# Patient Record
Sex: FEMALE | Race: WHITE | NOT HISPANIC OR LATINO | URBAN - METROPOLITAN AREA
[De-identification: names, ages, dates, MRNs, and addresses within clinical notes are randomized per-mention and may not be internally consistent; named-entity substitution may affect disease eponyms.]

---

## 2019-12-05 ENCOUNTER — AMBULATORY - HEALTHEAST (OUTPATIENT)
Dept: MATERNAL FETAL MEDICINE | Facility: HOSPITAL | Age: 39
End: 2019-12-05

## 2019-12-05 DIAGNOSIS — O26.90 PREGNANCY, ANTEPARTUM, COMPLICATIONS: ICD-10-CM

## 2019-12-13 ENCOUNTER — AMBULATORY - HEALTHEAST (OUTPATIENT)
Dept: MATERNAL FETAL MEDICINE | Facility: HOSPITAL | Age: 39
End: 2019-12-13

## 2019-12-16 ENCOUNTER — HOSPITAL ENCOUNTER (OUTPATIENT)
Dept: OBGYN | Facility: HOSPITAL | Age: 39
Discharge: HOME | End: 2019-12-16
Attending: OBSTETRICS & GYNECOLOGY | Admitting: OBSTETRICS & GYNECOLOGY

## 2019-12-16 LAB
ABO/RH(D): NORMAL
ALT SERPL W P-5'-P-CCNC: 12 U/L (ref 0–45)
ANTIBODY SCREEN: NEGATIVE
APTT PPP: 27 SECONDS (ref 24–37)
AST SERPL W P-5'-P-CCNC: 13 U/L (ref 0–40)
CREAT SERPL-MCNC: 0.57 MG/DL (ref 0.6–1.1)
CREAT UR-MCNC: 20.7 MG/DL
ERYTHROCYTE [DISTWIDTH] IN BLOOD BY AUTOMATED COUNT: 13 % (ref 11–14.5)
GFR SERPL CREATININE-BSD FRML MDRD: >60 ML/MIN/1.73M2
GLUCOSE BLDC GLUCOMTR-MCNC: 117 MG/DL (ref 70–139)
HCT VFR BLD AUTO: 35.8 % (ref 35–47)
HGB BLD-MCNC: 11.9 G/DL (ref 12–16)
INR PPP: 0.97 (ref 0.9–1.1)
MCH RBC QN AUTO: 31.3 PG (ref 27–34)
MCHC RBC AUTO-ENTMCNC: 33.2 G/DL (ref 32–36)
MCV RBC AUTO: 94 FL (ref 80–100)
PLATELET # BLD AUTO: 205 THOU/UL (ref 140–440)
PMV BLD AUTO: 10.9 FL (ref 8.5–12.5)
PROTEIN, RANDOM URINE - HISTORICAL: <7 MG/DL
PROTEIN/CREAT RATIO, RANDOM UR: NORMAL
RBC # BLD AUTO: 3.8 MILL/UL (ref 3.8–5.4)
URATE SERPL-MCNC: 3 MG/DL (ref 2–7.5)
WBC: 12.8 THOU/UL (ref 4–11)

## 2019-12-28 ENCOUNTER — ANESTHESIA - HEALTHEAST (OUTPATIENT)
Dept: OBGYN | Facility: HOSPITAL | Age: 39
End: 2019-12-28

## 2019-12-31 ENCOUNTER — HOME CARE/HOSPICE - HEALTHEAST (OUTPATIENT)
Dept: HOME HEALTH SERVICES | Facility: HOME HEALTH | Age: 39
End: 2019-12-31

## 2020-01-07 ENCOUNTER — RECORDS - HEALTHEAST (OUTPATIENT)
Dept: ADMINISTRATIVE | Facility: OTHER | Age: 40
End: 2020-01-07

## 2020-01-28 ENCOUNTER — RECORDS - HEALTHEAST (OUTPATIENT)
Dept: ADMINISTRATIVE | Facility: OTHER | Age: 40
End: 2020-01-28

## 2021-04-18 ENCOUNTER — AMBULATORY - HEALTHEAST (OUTPATIENT)
Dept: SURGERY | Facility: AMBULATORY SURGERY CENTER | Age: 41
End: 2021-04-18

## 2021-04-18 DIAGNOSIS — Z11.59 ENCOUNTER FOR SCREENING FOR OTHER VIRAL DISEASES: ICD-10-CM

## 2021-05-09 ENCOUNTER — AMBULATORY - HEALTHEAST (OUTPATIENT)
Dept: FAMILY MEDICINE | Facility: CLINIC | Age: 41
End: 2021-05-09

## 2021-05-09 DIAGNOSIS — Z11.59 ENCOUNTER FOR SCREENING FOR OTHER VIRAL DISEASES: ICD-10-CM

## 2021-05-09 LAB
SARS-COV-2 PCR COMMENT: NORMAL
SARS-COV-2 RNA SPEC QL NAA+PROBE: NEGATIVE
SARS-COV-2 VIRUS SPECIMEN SOURCE: NORMAL

## 2021-05-10 ENCOUNTER — COMMUNICATION - HEALTHEAST (OUTPATIENT)
Dept: SCHEDULING | Facility: CLINIC | Age: 41
End: 2021-05-10

## 2021-05-11 ENCOUNTER — RECORDS - HEALTHEAST (OUTPATIENT)
Dept: ADMINISTRATIVE | Facility: OTHER | Age: 41
End: 2021-05-11

## 2021-05-11 ENCOUNTER — ANESTHESIA - HEALTHEAST (OUTPATIENT)
Dept: SURGERY | Facility: AMBULATORY SURGERY CENTER | Age: 41
End: 2021-05-11

## 2021-05-11 ASSESSMENT — MIFFLIN-ST. JEOR: SCORE: 1609.36

## 2021-05-12 ENCOUNTER — SURGERY - HEALTHEAST (OUTPATIENT)
Dept: SURGERY | Facility: AMBULATORY SURGERY CENTER | Age: 41
End: 2021-05-12

## 2021-05-12 ENCOUNTER — RECORDS - HEALTHEAST (OUTPATIENT)
Dept: ADMINISTRATIVE | Facility: OTHER | Age: 41
End: 2021-05-12

## 2021-05-12 ENCOUNTER — COMMUNICATION - HEALTHEAST (OUTPATIENT)
Dept: SCHEDULING | Facility: CLINIC | Age: 41
End: 2021-05-12

## 2021-05-12 ASSESSMENT — MIFFLIN-ST. JEOR: SCORE: 1604.82

## 2021-05-27 VITALS — WEIGHT: 200 LBS | BODY MASS INDEX: 31.32 KG/M2

## 2021-05-27 VITALS — HEIGHT: 67 IN | BODY MASS INDEX: 31.32 KG/M2

## 2021-05-27 VITALS — WEIGHT: 201 LBS | BODY MASS INDEX: 31.48 KG/M2 | BODY MASS INDEX: 34.77 KG/M2 | HEIGHT: 67 IN

## 2021-05-29 ENCOUNTER — RECORDS - HEALTHEAST (OUTPATIENT)
Dept: ADMINISTRATIVE | Facility: CLINIC | Age: 41
End: 2021-05-29

## 2021-05-30 ENCOUNTER — RECORDS - HEALTHEAST (OUTPATIENT)
Dept: ADMINISTRATIVE | Facility: CLINIC | Age: 41
End: 2021-05-30

## 2021-06-04 VITALS — BODY MASS INDEX: 33.2 KG/M2 | BODY MASS INDEX: 33.2 KG/M2 | WEIGHT: 212 LBS | HEIGHT: 67 IN

## 2021-06-04 VITALS — BODY MASS INDEX: 33.2 KG/M2 | HEIGHT: 67 IN

## 2021-06-04 VITALS — BODY MASS INDEX: 33.99 KG/M2 | WEIGHT: 217 LBS

## 2021-06-04 NOTE — ANESTHESIA PROCEDURE NOTES
Epidural Block    Patient location during procedure: OB    Reason for Block:labor epidural  Staffing:  Performing  Anesthesiologist: Sharda Dent MD  Preanesthetic Checklist  Completed: patient identified, risks, benefits, and alternatives discussed, timeout performed, consent obtained, airway assessed, oxygen available, suction available, emergency drugs available and hand hygiene performed  Procedure  Patient position: sitting  Prep: ChloraPrep  Patient monitoring: continuous pulse oximetry, heart rate and blood pressure  Approach: midline  Location: L3-L4  Injection technique: BERYL saline  Number of Attempts:2  Needle  Needle type: Tuohy   Needle gauge: 17 G     Catheter in Space: 4 (BERYL @ 8, taped @ 12)  Assessment  Sensory level:      Events: positive IV test and Initial attepmt + test dose (negative aspiration, no blood in catheter on removal). Second placement successful

## 2021-06-04 NOTE — ANESTHESIA POSTPROCEDURE EVALUATION
Patient: Lauren Pacheco  * No procedures listed *  Anesthesia type: epidural    Patient location: Labor and Delivery  Last vitals: No vitals data found for the desired time range.    Post vital signs: stable  Level of consciousness: awake and responds to simple questions  Post-anesthesia pain: pain controlled  Post-anesthesia nausea and vomiting: no  Pulmonary: unassisted, return to baseline  Cardiovascular: stable and blood pressure at baseline  Hydration: adequate  Anesthetic events: no    QCDR Measures:  ASA# 11 - Gayla-op Cardiac Arrest: ASA11B - Patient did NOT experience unanticipated cardiac arrest  ASA# 12 - Gayla-op Mortality Rate: ASA12B - Patient did NOT die  ASA# 13 - PACU Re-Intubation Rate: ASA13B - Patient did NOT require a new airway mgmt  ASA# 10 - Composite Anes Safety: ASA10A - No serious adverse event    Additional Notes:    Neuraxial block has worn off, no residual numbness or weakness. Pain controlled. Denies headache or back pain. No further questions or concerns. Instructed that we are available 24/7 should she have questions pertaining to her epidural.

## 2021-06-04 NOTE — PROGRESS NOTES
"Lauren arrived to [facility] today about 1115. 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 At which time she took her blood sugar and blood pressure.  BG was 48 per her report and 127 after eating. She reports her /101 and -130. Overall \"not feeling well today\".   Lauren also explains that she has \"floaters\" in her vision this morning which she has had before due to ocular migraines.     EFM fetal baseline 155 accels and no decelerations, moderate variability. No contractions felt or palpated. Irritability traced on toco.  No vaginal bleeding, leaking of fluid, and patient reports no signs or symptoms of infection.     Blood pressures reading 145/96 - 154/100 .   No headache, no epigastric pains, no clonus and reflexes WDL.   Dr.K Pinto provided with the above information and I received orders for [facility] labs, POCT and Q30min BP until MD arrives to bedside to assess.   Patient and spouse in agreement to plan.       "

## 2021-06-04 NOTE — PROGRESS NOTES
Vitals:    12/16/19 1220 12/16/19 1307 12/16/19 1330 12/16/19 1406   BP: (!) 139/96 142/90 139/89 140/88   Patient Position:       Cuff Size:       Pulse: 100 (!) 102 99 98   Resp:       Temp:       TempSrc:         Lauren reports no headache, no vision changes, no epigastric pains, Reflexes WDL and no clonus. EFM indicates fetal baseline 140-145 accelerations, no decelerations and moderate variability. Irritability resolved, Abd palpated soft and Lauren reports she is not feeling any contractions.    Dr.K Pinto provided with update on EFM, vital signs, assessments and lab results.   Patient to discharge home and keep her scheduled appointment for Wednesday with Dr. Craft for follow up.   Patient is in agreement to plan.

## 2021-06-04 NOTE — PROGRESS NOTES
Triage note    40 yo  presented to triage for evaluation after checking her blood sugar and blood pressure at work. She said she did not feel well and had issues with hypoglycemia early in pregnancy so she checked her CBG and it was 48. She had a snack and rechecked her blood sugar and it was in the 120s. She did then check her blood pressure and the systolic pressure was in the 150s so she presented for further evaluation. She currently denies symptoms. She denies headaches, changes in vision or RUQ pain. + FM. Occasional, mild contractions. Denies vaginal bleeding or loss of fluid.     OB Complications:  H/o GHTN  HSV - Prophylaxis  IVF pregnancy with CRM  Sebaceous cysts of labia  H/o CT    Temp:  [98.5  F (36.9  C)] 98.5  F (36.9  C)  Heart Rate:  [] 98  Resp:  [20] 20  BP: (139-145)/(88-96) 140/88    CR 0.57  AST 13  ALT 12  Uric Acid 3  INR 0.97  PTT 27  Hgb: 11.9  HCT: 35.8  Plts 205    FHTs: 150s, Mod BTBV, + accel, no decels  Easley: Occational    A/P: 40 yo at 36w 1d presented for evaluation of blood pressure  -- BPs elevated but non severe. PIH labs wnl. OK to DC home. Keep scheduled follow up with Dr. Craft on Wednesday.     Di Pinto MD  (P) [phone]

## 2021-06-04 NOTE — PROGRESS NOTES
RN spoke with [facility] on  to clarify referral that was sent to [facility]. Order was for [facility] fetal echo s/t IVF. However, pt was approx 35 weeks and visualization of heart views was expected to be poor. Per M MD, order clarified with [facility] if they would still like an echo done vs planning on a  echo. [facility] confirmed they would have the pt schedule an echo following delivery. [facility] also declines a L2 or any other ultrasound with [facility] at this time. [facility] to follow up with the patient.

## 2021-06-04 NOTE — ANESTHESIA PREPROCEDURE EVALUATION
Anesthesia Evaluation      No history of anesthetic complications     Airway    Pulmonary    (-) asthma                         Cardiovascular   (+) hypertension (gestational. no s/sx of pre-e, labs wnl), ,      Neuro/Psych - negative ROS     Endo/Other    (+) obesity, pregnant ( 37w6d, IOL for GHTN, requesting labor epidural)     GI/Hepatic/Renal - negative ROS           Dental                         Anesthesia Plan  Planned anesthetic: epidural  Patient requests labor epidural. Chart reviewed, including labs. Reviewed options and risks with the patient, including but not limited to: bleeding, infection, damage to tissues under the skin (nerves, muscles, blood vessels), hypotension, headache, and epidural failure. Questions answered, consent signed. Patient agrees to elective labor epidural.     ASA 3     Anesthetic plan and risks discussed with: patient    Post-op plan: epidural analgesia

## 2021-06-17 NOTE — ANESTHESIA CARE TRANSFER NOTE
Last vitals:   Vitals:    05/12/21 1027   BP: 122/73   Pulse: 80   Resp: 18   Temp: 36.3  C (97.3  F)   SpO2: 95%     Patient's level of consciousness is drowsy  Spontaneous respirations: yes  Maintains airway independently: yes  Dentition unchanged: yes  Oropharynx: oropharynx clear of all foreign objects    QCDR Measures:  ASA# 20 - Surgical Safety Checklist: WHO surgical safety checklist completed prior to induction    PQRS# 430 - Adult PONV Prevention: 4558F - Pt received => 2 anti-emetic agents (different classes) preop & intraop  ASA# 8 - Peds PONV Prevention: NA - Not pediatric patient, not GA or 2 or more risk factors NOT present  PQRS# 424 - Gayla-op Temp Management: 4559F - At least one body temp DOCUMENTED => 35.5C or 95.9F within required timeframe  PQRS# 426 - PACU Transfer Protocol: - Transfer of care checklist used  ASA# 14 - Acute Post-op Pain: ASA14B - Patient did NOT experience pain >= 7 out of 10

## 2021-06-17 NOTE — ANESTHESIA POSTPROCEDURE EVALUATION
Patient: Lauren Pacheco  Procedure(s):  HYSTEROSCOPY, WITH DILATION AND CURETTAGE  Anesthesia type: MAC    Patient location: Phase II Recovery  Last vitals:   Vitals Value Taken Time   /76 05/12/21 1101   Temp 36.3  C (97.3  F) 05/12/21 1027   Pulse 67 05/12/21 1131   Resp 16 05/12/21 1151   SpO2 99 % 05/12/21 1131   Vitals shown include unvalidated device data.  Post vital signs: stable  Level of consciousness: awake and responds to simple questions  Post-anesthesia pain: pain controlled  Post-anesthesia nausea and vomiting: no  Pulmonary: unassisted, return to baseline  Cardiovascular: stable and blood pressure at baseline  Hydration: adequate  Anesthetic events: no    QCDR Measures:  ASA# 11 - Gayla-op Cardiac Arrest: ASA11B - Patient did NOT experience unanticipated cardiac arrest  ASA# 12 - Gayla-op Mortality Rate: ASA12B - Patient did NOT die  ASA# 13 - PACU Re-Intubation Rate: ASA13B - Patient did NOT require a new airway mgmt  ASA# 10 - Composite Anes Safety: ASA10A - No serious adverse event    Additional Notes:

## 2021-06-17 NOTE — ANESTHESIA PREPROCEDURE EVALUATION
Anesthesia Evaluation      Patient summary reviewed   No history of anesthetic complications     Airway   Mallampati: II   Pulmonary - negative ROS and normal exam                          Cardiovascular - negative ROS and normal exam  Exercise tolerance: > or = 4 METS  Hypertension: gestational.  Rhythm: regular  Rate: normal,         Neuro/Psych - negative ROS     Endo/Other - negative ROS   (+) obesity,      GI/Hepatic/Renal    (+) hiatal hernia, GERD well controlled,        Other findings: Results for NATASHA MEJIA (MRN [id number]) as of 5/12/2021 09:13    5/9/2021 09:41  SARS-CoV-2 Virus Specimen Source: Nasopharyngeal  SARS-CoV-2 PCR Result: NEGATIVE    Hgb=12.5      Dental - normal exam                        Anesthesia Plan  Planned anesthetic: MAC  The patient understands and accepts the risks of MAC anesthesia including (but not limited to) nausea, vomiting, dizziness, and chipped teeth. I also discussed the possibility of conversion to GAETT/GALMA anesthesia which include hoarse voice, sore throat, and pinched lip or chipped teeth.  Versed/fent  propofol ggt  Decadron/zofran    ASA 2     Anesthetic plan and risks discussed with: patient  Anesthesia plan special considerations: antiemetics,   Post-op plan: routine recovery

## 2022-01-12 VITALS — HEIGHT: 67 IN | BODY MASS INDEX: 31.39 KG/M2 | WEIGHT: 200 LBS
